# Patient Record
Sex: FEMALE | Race: WHITE | NOT HISPANIC OR LATINO
[De-identification: names, ages, dates, MRNs, and addresses within clinical notes are randomized per-mention and may not be internally consistent; named-entity substitution may affect disease eponyms.]

---

## 2017-06-03 ENCOUNTER — TRANSCRIPTION ENCOUNTER (OUTPATIENT)
Age: 32
End: 2017-06-03

## 2019-01-08 ENCOUNTER — RESULT REVIEW (OUTPATIENT)
Age: 34
End: 2019-01-08

## 2019-01-09 ENCOUNTER — APPOINTMENT (OUTPATIENT)
Dept: OBGYN | Facility: CLINIC | Age: 34
End: 2019-01-09
Payer: COMMERCIAL

## 2019-01-09 PROBLEM — Z00.00 ENCOUNTER FOR PREVENTIVE HEALTH EXAMINATION: Status: ACTIVE | Noted: 2019-01-09

## 2019-01-09 PROCEDURE — 99385 PREV VISIT NEW AGE 18-39: CPT

## 2019-11-01 ENCOUNTER — EMERGENCY (EMERGENCY)
Facility: HOSPITAL | Age: 34
LOS: 1 days | Discharge: HOME | End: 2019-11-01
Attending: EMERGENCY MEDICINE
Payer: COMMERCIAL

## 2019-11-01 VITALS
OXYGEN SATURATION: 99 % | WEIGHT: 184.09 LBS | TEMPERATURE: 98 F | HEART RATE: 113 BPM | HEIGHT: 68 IN | DIASTOLIC BLOOD PRESSURE: 100 MMHG | SYSTOLIC BLOOD PRESSURE: 145 MMHG | RESPIRATION RATE: 18 BRPM

## 2019-11-01 PROCEDURE — 73630 X-RAY EXAM OF FOOT: CPT

## 2019-11-01 PROCEDURE — 73610 X-RAY EXAM OF ANKLE: CPT | Mod: 26,RT

## 2019-11-01 PROCEDURE — 73610 X-RAY EXAM OF ANKLE: CPT

## 2019-11-01 PROCEDURE — 99283 EMERGENCY DEPT VISIT LOW MDM: CPT | Mod: 25

## 2019-11-01 PROCEDURE — 73630 X-RAY EXAM OF FOOT: CPT | Mod: 26,RT

## 2019-11-01 PROCEDURE — 29515 APPLICATION SHORT LEG SPLINT: CPT | Mod: RT

## 2019-11-01 PROCEDURE — 99284 EMERGENCY DEPT VISIT MOD MDM: CPT | Mod: 25

## 2019-11-01 PROCEDURE — 29515 APPLICATION SHORT LEG SPLINT: CPT

## 2019-11-01 RX ORDER — ACETAMINOPHEN 500 MG
975 TABLET ORAL ONCE
Refills: 0 | Status: COMPLETED | OUTPATIENT
Start: 2019-11-01 | End: 2019-11-01

## 2019-11-01 RX ADMIN — Medication 975 MILLIGRAM(S): at 21:49

## 2019-11-01 NOTE — ED PROVIDER NOTE - PATIENT PORTAL LINK FT
You can access the FollowMyHealth Patient Portal offered by [facility] by registering at the following website: [email or web address]. By joining [employer]’s FollowMyHealth portal, you will also be able to view your health information using other applications (apps) compatible with our system.

## 2019-11-01 NOTE — ED PROVIDER NOTE - CARE PROVIDER_API CALL
Joel Velasco)  Orthopaedic Surgery  [address] [place] [address], [address]  [place] [address]  Phone: [phone]  Fax: [phone]  Follow Up Time:

## 2019-11-01 NOTE — ED PROVIDER NOTE - PHYSICAL EXAMINATION
Physical Exam:    I have reviewed the triage vital signs.    Gen: NAD, AOx3, non-toxic appearing, able to ambulate without assistance  Head and Neck: NCAT, Neck supple without meningismus   HEENT: EOMI, PEERLA, normal conjunctiva, tongue midline, oral mucosa moist  Lung: CTAB, no respiratory distress, no wheezes/rhonchi/rales B/L, speaking in full sentences  CV: RRR, no murmurs, rubs or gallops  Abd: soft, NT, ND, no guarding, no rigidity, no rebound tenderness, no CVA tenderness, no masses.   MSK: no gross deformities, ROM normal in UE/LE, no back tenderness. ROm decreased in all planes of right ankle due to pain.   Neuro: CNs II-XII grossly intact. No focal sensory or motor deficits  Skin: Warm, well perfused, no rash, no leg swelling. + ankle swelling right non pitting. + ecchymosis medial and lateral aspects of ankle and bases of toes on right foot.   Psych: Appropriate mood and affect

## 2019-11-01 NOTE — ED ADULT NURSE NOTE - OBJECTIVE STATEMENT
34y/o female presented to the ED from home with com plaint of ankle pain. A&Ox3, ambulatory. Patients states that she injured her right ankle x8 days ago while in [place]. patient states that she jumped over a flower pot and inverted her rigth ankle. Did not receive any medical attention at that time. Has been asking Tylenol and Motrin with mild relief. Right ankle visibly swollen, no deformity noted. bruising noted over all toes and tracking up toward ankle area. Tenderness upon palpation. +cap refill , + pedal pulses. Patient has been able to ambulate on right lower extremity with pain.

## 2019-11-01 NOTE — ED PROVIDER NOTE - CLINICAL SUMMARY MEDICAL DECISION MAKING FREE TEXT BOX
Mojgan PGY-1: adult female inversion ankle injury 8 days ago has not received medical care until today c/o mod/severe pain, poor weight bearing, Mojgan PGY-1: adult female inversion ankle injury 8 days ago has not received medical care until today c/o mod/severe pain, poor weight bearing,\    Attending Statement: Agree with the above.  Inversion injury several days ago.  tenderness to palpation at med and lat mal.  Swelling but compartments are soft and extremity is NVI.  XR to eval for fx, pain Rx, splint, reassess.  --BMM

## 2019-11-01 NOTE — ED PROVIDER NOTE - NSFOLLOWUPINSTRUCTIONS_ED_ALL_ED_FT
+chest tightness
You were seen for ankle pain.     Please take 600 mg of Ibuprofen (aka Motrin, Advil) and/or 650 mg Acetaminophen (aka Tylenol) every 6 hours, as needed, for mild-moderate pain.      Seek immediate medical assistance for any new or worsening symptoms. Take splint off if foot goes numb or pain increases.     No weight bearing until after follow up with orthopedic. Follow up with orthopedic ASAP. Use crutches.

## 2019-11-01 NOTE — ED PROVIDER NOTE - PROGRESS NOTE DETAILS
Pt advised to remain nwb c crutches given bimalleolar tenderness to palpation/ecchymosis.  To f/u c ortho.  --BMM

## 2019-11-01 NOTE — ED PROVIDER NOTE - OBJECTIVE STATEMENT
33 year old female, no pmh, c/o right ankle pain since 8 days ago where she jumped over a pot, landed on her ankle, inverted it and fell to the ground. Patient c/o pain after fall. Patient able to ambulate after fall but with pain. Patient was in  [place] and did not receive any medical care/imaging/exams. Patient endorsed swelling on ankle, tenderness to palpation over ankle and foot mostly on lateral aspect of ankle. pt had discoloration of all toes and forefoot tracking prox towards midfoot. patient has numbness over forefoot intermittently. pt tied ice, ibuprofen and herbal oil remedy for pain. Swelling has decreased but pain is still present and severe. Weight bearing but only on ball of foot.

## 2019-11-02 NOTE — ED ADULT NURSE REASSESSMENT NOTE - NS ED NURSE REASSESS COMMENT FT1
MD Schwarz educated patient on proper use of crutches prior to discharge. Patient demonstrated use appropriately. MD Schwarz d/c patient.

## 2020-10-03 ENCOUNTER — TRANSCRIPTION ENCOUNTER (OUTPATIENT)
Age: 35
End: 2020-10-03

## 2021-01-03 ENCOUNTER — TRANSCRIPTION ENCOUNTER (OUTPATIENT)
Age: 36
End: 2021-01-03

## 2022-02-19 ENCOUNTER — TRANSCRIPTION ENCOUNTER (OUTPATIENT)
Age: 37
End: 2022-02-19

## 2023-12-26 ENCOUNTER — NON-APPOINTMENT (OUTPATIENT)
Age: 38
End: 2023-12-26

## 2025-02-09 ENCOUNTER — NON-APPOINTMENT (OUTPATIENT)
Age: 40
End: 2025-02-09

## 2025-03-04 ENCOUNTER — NON-APPOINTMENT (OUTPATIENT)
Age: 40
End: 2025-03-04

## 2025-04-21 ENCOUNTER — TRANSCRIPTION ENCOUNTER (OUTPATIENT)
Age: 40
End: 2025-04-21

## 2025-04-21 ENCOUNTER — EMERGENCY (EMERGENCY)
Facility: HOSPITAL | Age: 40
LOS: 1 days | End: 2025-04-21
Attending: EMERGENCY MEDICINE
Payer: COMMERCIAL

## 2025-04-21 VITALS
HEIGHT: 67 IN | TEMPERATURE: 97 F | DIASTOLIC BLOOD PRESSURE: 89 MMHG | RESPIRATION RATE: 17 BRPM | WEIGHT: 199.96 LBS | OXYGEN SATURATION: 98 % | SYSTOLIC BLOOD PRESSURE: 140 MMHG | HEART RATE: 90 BPM

## 2025-04-21 VITALS
TEMPERATURE: 99 F | SYSTOLIC BLOOD PRESSURE: 142 MMHG | RESPIRATION RATE: 18 BRPM | DIASTOLIC BLOOD PRESSURE: 94 MMHG | OXYGEN SATURATION: 97 % | HEART RATE: 76 BPM

## 2025-04-21 PROBLEM — Z78.9 OTHER SPECIFIED HEALTH STATUS: Chronic | Status: ACTIVE | Noted: 2019-11-01

## 2025-04-21 LAB
ALBUMIN SERPL ELPH-MCNC: 4.5 G/DL — SIGNIFICANT CHANGE UP (ref 3.3–5)
ALP SERPL-CCNC: 77 U/L — SIGNIFICANT CHANGE UP (ref 40–120)
ALT FLD-CCNC: 28 U/L — SIGNIFICANT CHANGE UP (ref 10–45)
ANION GAP SERPL CALC-SCNC: 12 MMOL/L — SIGNIFICANT CHANGE UP (ref 5–17)
AST SERPL-CCNC: 29 U/L — SIGNIFICANT CHANGE UP (ref 10–40)
BASOPHILS # BLD AUTO: 0.04 K/UL — SIGNIFICANT CHANGE UP (ref 0–0.2)
BASOPHILS NFR BLD AUTO: 0.6 % — SIGNIFICANT CHANGE UP (ref 0–2)
BILIRUB SERPL-MCNC: 0.2 MG/DL — SIGNIFICANT CHANGE UP (ref 0.2–1.2)
BUN SERPL-MCNC: 9 MG/DL — SIGNIFICANT CHANGE UP (ref 7–23)
CALCIUM SERPL-MCNC: 9.4 MG/DL — SIGNIFICANT CHANGE UP (ref 8.4–10.5)
CHLORIDE SERPL-SCNC: 104 MMOL/L — SIGNIFICANT CHANGE UP (ref 96–108)
CO2 SERPL-SCNC: 24 MMOL/L — SIGNIFICANT CHANGE UP (ref 22–31)
CREAT SERPL-MCNC: 0.86 MG/DL — SIGNIFICANT CHANGE UP (ref 0.5–1.3)
EGFR: 88 ML/MIN/1.73M2 — SIGNIFICANT CHANGE UP
EGFR: 88 ML/MIN/1.73M2 — SIGNIFICANT CHANGE UP
EOSINOPHIL # BLD AUTO: 0.13 K/UL — SIGNIFICANT CHANGE UP (ref 0–0.5)
EOSINOPHIL NFR BLD AUTO: 2.1 % — SIGNIFICANT CHANGE UP (ref 0–6)
FLUAV AG NPH QL: SIGNIFICANT CHANGE UP
FLUBV AG NPH QL: SIGNIFICANT CHANGE UP
GLUCOSE SERPL-MCNC: 99 MG/DL — SIGNIFICANT CHANGE UP (ref 70–99)
HCG SERPL-ACNC: <2 MIU/ML — SIGNIFICANT CHANGE UP
HCT VFR BLD CALC: 42.6 % — SIGNIFICANT CHANGE UP (ref 34.5–45)
HGB BLD-MCNC: 14.4 G/DL — SIGNIFICANT CHANGE UP (ref 11.5–15.5)
IMM GRANULOCYTES NFR BLD AUTO: 0.3 % — SIGNIFICANT CHANGE UP (ref 0–0.9)
LYMPHOCYTES # BLD AUTO: 2.14 K/UL — SIGNIFICANT CHANGE UP (ref 1–3.3)
LYMPHOCYTES # BLD AUTO: 33.8 % — SIGNIFICANT CHANGE UP (ref 13–44)
MCHC RBC-ENTMCNC: 30.9 PG — SIGNIFICANT CHANGE UP (ref 27–34)
MCHC RBC-ENTMCNC: 33.8 G/DL — SIGNIFICANT CHANGE UP (ref 32–36)
MCV RBC AUTO: 91.4 FL — SIGNIFICANT CHANGE UP (ref 80–100)
MONOCYTES # BLD AUTO: 0.61 K/UL — SIGNIFICANT CHANGE UP (ref 0–0.9)
MONOCYTES NFR BLD AUTO: 9.6 % — SIGNIFICANT CHANGE UP (ref 2–14)
NEUTROPHILS # BLD AUTO: 3.39 K/UL — SIGNIFICANT CHANGE UP (ref 1.8–7.4)
NEUTROPHILS NFR BLD AUTO: 53.6 % — SIGNIFICANT CHANGE UP (ref 43–77)
NRBC BLD AUTO-RTO: 0 /100 WBCS — SIGNIFICANT CHANGE UP (ref 0–0)
PLATELET # BLD AUTO: 317 K/UL — SIGNIFICANT CHANGE UP (ref 150–400)
POTASSIUM SERPL-MCNC: 3.9 MMOL/L — SIGNIFICANT CHANGE UP (ref 3.5–5.3)
POTASSIUM SERPL-SCNC: 3.9 MMOL/L — SIGNIFICANT CHANGE UP (ref 3.5–5.3)
PROT SERPL-MCNC: 7.6 G/DL — SIGNIFICANT CHANGE UP (ref 6–8.3)
RBC # BLD: 4.66 M/UL — SIGNIFICANT CHANGE UP (ref 3.8–5.2)
RBC # FLD: 12.6 % — SIGNIFICANT CHANGE UP (ref 10.3–14.5)
RSV RNA NPH QL NAA+NON-PROBE: SIGNIFICANT CHANGE UP
SARS-COV-2 RNA SPEC QL NAA+PROBE: SIGNIFICANT CHANGE UP
SODIUM SERPL-SCNC: 140 MMOL/L — SIGNIFICANT CHANGE UP (ref 135–145)
SOURCE RESPIRATORY: SIGNIFICANT CHANGE UP
TROPONIN T, HIGH SENSITIVITY RESULT: <6 NG/L — SIGNIFICANT CHANGE UP (ref 0–51)
WBC # BLD: 6.33 K/UL — SIGNIFICANT CHANGE UP (ref 3.8–10.5)
WBC # FLD AUTO: 6.33 K/UL — SIGNIFICANT CHANGE UP (ref 3.8–10.5)

## 2025-04-21 PROCEDURE — 85025 COMPLETE CBC W/AUTO DIFF WBC: CPT

## 2025-04-21 PROCEDURE — 84702 CHORIONIC GONADOTROPIN TEST: CPT

## 2025-04-21 PROCEDURE — 93010 ELECTROCARDIOGRAM REPORT: CPT

## 2025-04-21 PROCEDURE — 36000 PLACE NEEDLE IN VEIN: CPT

## 2025-04-21 PROCEDURE — 87637 SARSCOV2&INF A&B&RSV AMP PRB: CPT

## 2025-04-21 PROCEDURE — 93005 ELECTROCARDIOGRAM TRACING: CPT

## 2025-04-21 PROCEDURE — 71046 X-RAY EXAM CHEST 2 VIEWS: CPT

## 2025-04-21 PROCEDURE — 99284 EMERGENCY DEPT VISIT MOD MDM: CPT

## 2025-04-21 PROCEDURE — 99285 EMERGENCY DEPT VISIT HI MDM: CPT | Mod: 25

## 2025-04-21 PROCEDURE — 84484 ASSAY OF TROPONIN QUANT: CPT

## 2025-04-21 PROCEDURE — 71046 X-RAY EXAM CHEST 2 VIEWS: CPT | Mod: 26

## 2025-04-21 PROCEDURE — 80053 COMPREHEN METABOLIC PANEL: CPT

## 2025-04-21 RX ORDER — ACETAMINOPHEN 500 MG/5ML
975 LIQUID (ML) ORAL ONCE
Refills: 0 | Status: ACTIVE | OUTPATIENT
Start: 2025-04-21 | End: 2026-03-20

## 2025-04-21 RX ORDER — ACETAMINOPHEN 500 MG/5ML
975 LIQUID (ML) ORAL ONCE
Refills: 0 | Status: DISCONTINUED | OUTPATIENT
Start: 2025-04-21 | End: 2025-04-21

## 2025-04-21 RX ORDER — MAGNESIUM, ALUMINUM HYDROXIDE 200-200 MG
30 TABLET,CHEWABLE ORAL ONCE
Refills: 0 | Status: COMPLETED | OUTPATIENT
Start: 2025-04-21 | End: 2025-04-21

## 2025-04-21 RX ORDER — ACETAMINOPHEN 500 MG/5ML
975 LIQUID (ML) ORAL EVERY 4 HOURS
Refills: 0 | Status: DISCONTINUED | OUTPATIENT
Start: 2025-04-21 | End: 2025-04-21

## 2025-04-21 RX ADMIN — Medication 20 MILLIGRAM(S): at 14:27

## 2025-04-21 RX ADMIN — Medication 30 MILLILITER(S): at 14:27

## 2025-04-21 NOTE — ED PROVIDER NOTE - PATIENT PORTAL LINK FT
You can access the FollowMyHealth Patient Portal offered by [facility] by registering at the following website: [email or web address]. By joining [employer]’s FollowMyHealth portal, you will also be able to view your health information using other applications (apps) compatible with our system.

## 2025-04-21 NOTE — ED PROVIDER NOTE - CLINICAL SUMMARY MEDICAL DECISION MAKING FREE TEXT BOX
39F with PMH ADD p/w chest pain that is episodic and pressure like a/w episodic palpitations that she feels. 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 Denies f/c, n/v/c/d, URI sx, cough, abdominal pain, recent travel, sick contacts, recent illness. She took aspirin at the onset of the pain this morning and admits to some relief of the pain.   Patient is well appearing, HDS afebrile, with reproducibility to pain on palpation. Will give pepcid if pain is due to GERD. Otherwise will give tylenol, and dc if trop negative. Has ekg that is unchanged from ekg she brought with her from 3 years ago.

## 2025-04-21 NOTE — ED ADULT NURSE NOTE - OBJECTIVE STATEMENT
38 y/o F with no significant PMHx presents to the ED with complaints of L sided chest pain beginning 0600 today. Patient notes pain is constant, described as a dull ache, radiating to the back and L arm. Patient states at approx. 0800 took 162mg of aspirin with mild improvement of sxs. Patient notes pain 4/10 in severity at present. Of note, patient states had episode of palpitations a few days ago lasting approx. 20 minutes described as "vibrating." Denies fever, chills, congestion, SOB, abdominal pain. AOx4 and speaking coherently. Breathing is unlabored, spontaneous, and symmetrical. Abdomen is soft, nondistended, and nontender. No bladder distention. No peripheral edema noted. <2s capillary refill. Ambulatory with full ROM of all extremities. NSR on cardiac monitor.

## 2025-04-21 NOTE — ED PROVIDER NOTE - PROGRESS NOTE DETAILS
Received signout on 39-year-old female she states that she feels well "I am ready to be discharged".  Patient states that she will follow-up outpatient with her primary care states she is feeling much improved after negative troponin patient comfortable with outpatient follow-up.  I have assumed care of this patient. I have fully participated in the care of this patient. I have made amendments to the documentation where appropriate and have supervised the ongoing plan of care enacted by the Fellow/Resident/ACP/Student.

## 2025-04-21 NOTE — ED PROVIDER NOTE - PHYSICAL EXAMINATION
Physical Exam:  Gen: NAD, lying comfortably  HEENT: normal conjunctiva, oral mucosa moist  Lung: no respiratory distress, speaking in full sentences  CV: RRR  Abd: soft, NT, ND, no CVA tenderness   MSK: no visible deformities  Neuro: No focal sensory or motor deficits  Skin: Warm, well perfused

## 2025-04-21 NOTE — ED PROVIDER NOTE - NSFOLLOWUPINSTRUCTIONS_ED_ALL_ED_FT
You were seen in the ED for chest pain. Your blood tests, a chest x-ray and EKG reveal no concerning abnormalities at this time. It is not entirely clear what is causing your chest pain at this time, however it does not appear to be anything immediately dangerous from our evaluation. Return to the ED if your chest pain worsens, you develop trouble breathing, palpitations, fevers/chills or for any other concerns. Please see your regular doctor in the next 2-3 days to ensure you are improving.

## 2025-04-21 NOTE — ED PROVIDER NOTE - ATTENDING CONTRIBUTION TO CARE
healthy 39f pw cp since early this am  on exam, rrr, ctab  plan for rule out acs, likely gerd or msk  perc neg healthy 39f pw cp since early this am  on exam, rrr, ctab  plan for rule out acs, likely gerd or msk  perc neg  I read ekg as nsr rate 84, incomplete right bundle, anterior qs, no st elevation or depression, qtc 449.